# Patient Record
Sex: FEMALE | Race: WHITE | Employment: UNEMPLOYED | URBAN - METROPOLITAN AREA
[De-identification: names, ages, dates, MRNs, and addresses within clinical notes are randomized per-mention and may not be internally consistent; named-entity substitution may affect disease eponyms.]

---

## 2017-07-12 ENCOUNTER — TELEPHONE (OUTPATIENT)
Dept: FAMILY MEDICINE CLINIC | Age: 27
End: 2017-07-12

## 2017-07-13 ENCOUNTER — NURSE ONLY (OUTPATIENT)
Dept: FAMILY MEDICINE CLINIC | Age: 27
End: 2017-07-13

## 2017-07-13 DIAGNOSIS — Z23 NEED FOR TDAP VACCINATION: Primary | ICD-10-CM

## 2017-07-13 PROCEDURE — 90471 IMMUNIZATION ADMIN: CPT | Performed by: FAMILY MEDICINE

## 2017-07-13 PROCEDURE — 90715 TDAP VACCINE 7 YRS/> IM: CPT | Performed by: FAMILY MEDICINE

## 2017-10-03 ENCOUNTER — OFFICE VISIT (OUTPATIENT)
Dept: FAMILY MEDICINE CLINIC | Age: 27
End: 2017-10-03

## 2017-10-03 VITALS
DIASTOLIC BLOOD PRESSURE: 70 MMHG | HEART RATE: 80 BPM | OXYGEN SATURATION: 98 % | BODY MASS INDEX: 23.11 KG/M2 | WEIGHT: 143.2 LBS | SYSTOLIC BLOOD PRESSURE: 110 MMHG

## 2017-10-03 DIAGNOSIS — R20.8 SKIN PAIN: ICD-10-CM

## 2017-10-03 DIAGNOSIS — B07.0 PLANTAR WART OF RIGHT FOOT: Primary | ICD-10-CM

## 2017-10-03 PROCEDURE — 99999 PR OFFICE/OUTPT VISIT,PROCEDURE ONLY: CPT | Performed by: FAMILY MEDICINE

## 2017-10-03 PROCEDURE — 17110 DESTRUCTION B9 LES UP TO 14: CPT | Performed by: FAMILY MEDICINE

## 2017-10-03 NOTE — PATIENT INSTRUCTIONS
Please read the healthy family handout that you were given and share it with your family. Please compare this printed medication list with your medications at home to be sure they are the same. If you have any medications that are different please contact us immediately at [phone]. Also review your allergies that we have listed, these may also include medications that you have not been able to tolerate, make sure everything listed is correct. If you have any allergies that are different please contact us immediately at [phone].

## 2017-10-03 NOTE — MR AVS SNAPSHOT
After Visit Summary             Ashley Klein   10/3/2017 2:40 PM   Office Visit    Description:  Female : 1990   Provider:  Piyush Fernandes MD   Department:  [facility]              Your Follow-Up and Future Appointments         Below is a list of your follow-up and future appointments. This may not be a complete list as you may have made appointments directly with providers that we are not aware of or your providers may have made some for you. Please call your providers to confirm appointments. It is important to keep your appointments. Please bring your current insurance card, photo ID, co-pay, and all medication bottles to your appointment. If self-pay, payment is expected at the time of service. Information from Your Visit        Department     Name Address Phone Fax    [address]  [phone]      You Were Seen for:         Comments    Plantar wart of right foot   [[id number]]         Vital Signs     Blood Pressure Pulse Weight Oxygen Saturation Breastfeeding? Body Mass Index    110/70 80 143 lb 3.2 oz (65 kg) 98% Yes 23.11 kg/m2    Smoking Status                   Never Smoker           Instructions     Please read the healthy family handout that you were given and share it with your family. Please compare this printed medication list with your medications at home to be sure they are the same. If you have any medications that are different please contact us immediately at [phone]. Also review your allergies that we have listed, these may also include medications that you have not been able to tolerate, make sure everything listed is correct. If you have any allergies that are different please contact us immediately at [phone].               Today's Medication Changes          These changes are accurate as of: 10/3/17  3:10 PM.  If you have any questions, ask your nurse or doctor. STOP taking these medications           CALCIUM PO   Stopped by:  Kylee Samuels MD       [facility] by:  Kylee Samuels MD       NUVARING 0.12-0.015 MG/24HR vaginal ring   Generic drug:  etonogestrel-ethinyl estradiol   Stopped by:  Kylee Samuels MD       rizatriptan 10 MG tablet   Commonly known as:  [place] by:  Kylee Samuels MD       topiramate 25 MG tablet   Commonly known as:  TOPAMAX   Stopped by:  Kylee Samuels MD       ZOLMitriptan 5 MG tablet   Commonly known as:  [place] Pock by:  Kylee Samuels MD               Your Current Medications Are              amitriptyline (ELAVIL) 25 MG tablet Take 1 tablet by mouth nightly    UNABLE TO FIND [facility]      Allergies           No Known Allergies      We Ordered/Performed the following           76240 - ME DESTRUCTION BENIGN LESIONS UP TO 15          Additional Information        Basic Information     Date Of Birth Sex Race Ethnicity Preferred Language    1990 Female White Non-/Non  English      Problem List as of 10/3/2017  Date Reviewed: 8/23/2016                Migraine without aura and without status migrainosus, not intractable      Immunizations as of 10/3/2017     Name Date    HPV Gardasil Quadrivalent 1/1/2008, 1/1/2008, 1/1/2008    Td 1/1/2010    Tdap (Boostrix, Adacel) 7/13/2017      Preventive Care        Date Due    HIV screening is recommended for all people regardless of risk factors  aged 15-65 years at least once (lifetime) who have never been HIV tested. 11/29/2004    Pap Smear 11/29/2010    Yearly Flu Vaccine (1) 9/1/2017    Tetanus Combination Vaccine (2 - Td) 7/13/2027            MyChart Signup           Our records indicate that you have declined MyChart signup.

## 2017-10-03 NOTE — PROGRESS NOTES
Procedure: Cryotherapy of 1 lesion    Indication: lesions of skin, rt foot 4th toe tip. the lesion is causing discomfort    Anesthesia: None    Narrative: The procedure was explained. All questions were answered. Consent was obtained. Universal protocol was followed and sites were confirmed with patient. Liquid nitrogen was used to perform a 40 second freeze of each lesion. Patient tolerated procedure well. Post procedure care was explained to patient. Call if lesion does not totally resolve after healing. Call for any signs of infection or other problems related to the procedure.

## 2017-12-20 ENCOUNTER — TELEPHONE (OUTPATIENT)
Dept: FAMILY MEDICINE CLINIC | Age: 27
End: 2017-12-20

## 2017-12-20 NOTE — TELEPHONE ENCOUNTER
The patient had to leave work due to a migraine.  She will need a work note for today, is this ok to do? rc

## 2018-02-19 ENCOUNTER — OFFICE VISIT (OUTPATIENT)
Dept: FAMILY MEDICINE CLINIC | Age: 28
End: 2018-02-19

## 2018-02-19 VITALS
BODY MASS INDEX: 21.74 KG/M2 | HEIGHT: 66 IN | OXYGEN SATURATION: 97 % | SYSTOLIC BLOOD PRESSURE: 101 MMHG | WEIGHT: 135.25 LBS | TEMPERATURE: 98.5 F | HEART RATE: 93 BPM | DIASTOLIC BLOOD PRESSURE: 67 MMHG

## 2018-02-19 DIAGNOSIS — L65.9 HAIR LOSS: Primary | ICD-10-CM

## 2018-02-19 PROCEDURE — 99213 OFFICE O/P EST LOW 20 MIN: CPT | Performed by: FAMILY MEDICINE

## 2018-02-19 ASSESSMENT — PATIENT HEALTH QUESTIONNAIRE - PHQ9
SUM OF ALL RESPONSES TO PHQ QUESTIONS 1-9: 0
1. LITTLE INTEREST OR PLEASURE IN DOING THINGS: 0
2. FEELING DOWN, DEPRESSED OR HOPELESS: 0
SUM OF ALL RESPONSES TO PHQ9 QUESTIONS 1 & 2: 0

## 2018-02-19 NOTE — PATIENT INSTRUCTIONS
Please read the healthy family handout that you were given and share it with your family. Please compare this printed medication list with your medications at home to be sure they are the same. If you have any medications that are different please contact us immediately at [phone]. Also review your allergies that we have listed, these may also include medications that you have not been able to tolerate, make sure everything listed is correct. If you have any allergies that are different please contact us immediately at [phone].

## 2018-04-14 ENCOUNTER — TELEPHONE (OUTPATIENT)
Dept: FAMILY MEDICINE CLINIC | Age: 28
End: 2018-04-14

## 2018-04-14 RX ORDER — NYSTATIN 100000 U/G
OINTMENT TOPICAL
Qty: 1 TUBE | Refills: 0 | Status: SHIPPED | OUTPATIENT
Start: 2018-04-14 | End: 2019-10-07 | Stop reason: ALTCHOICE

## 2018-07-27 ENCOUNTER — OFFICE VISIT (OUTPATIENT)
Dept: FAMILY MEDICINE CLINIC | Age: 28
End: 2018-07-27

## 2018-07-27 VITALS
OXYGEN SATURATION: 98 % | HEART RATE: 77 BPM | SYSTOLIC BLOOD PRESSURE: 118 MMHG | DIASTOLIC BLOOD PRESSURE: 77 MMHG | BODY MASS INDEX: 22.21 KG/M2 | WEIGHT: 137.6 LBS

## 2018-07-27 DIAGNOSIS — B07.9 VIRAL WART ON FINGER: Primary | ICD-10-CM

## 2018-07-27 PROCEDURE — 99212 OFFICE O/P EST SF 10 MIN: CPT | Performed by: NURSE PRACTITIONER

## 2018-07-27 PROCEDURE — 17110 DESTRUCTION B9 LES UP TO 14: CPT | Performed by: NURSE PRACTITIONER

## 2018-07-27 ASSESSMENT — ENCOUNTER SYMPTOMS
NAUSEA: 0
ROS SKIN COMMENTS: + WART

## 2019-10-07 ENCOUNTER — OFFICE VISIT (OUTPATIENT)
Dept: FAMILY MEDICINE CLINIC | Age: 29
End: 2019-10-07
Payer: COMMERCIAL

## 2019-10-07 VITALS
WEIGHT: 153.4 LBS | DIASTOLIC BLOOD PRESSURE: 77 MMHG | BODY MASS INDEX: 24.65 KG/M2 | HEIGHT: 66 IN | OXYGEN SATURATION: 98 % | HEART RATE: 95 BPM | SYSTOLIC BLOOD PRESSURE: 114 MMHG

## 2019-10-07 DIAGNOSIS — H10.31 ACUTE BACTERIAL CONJUNCTIVITIS OF RIGHT EYE: Primary | ICD-10-CM

## 2019-10-07 PROCEDURE — 99213 OFFICE O/P EST LOW 20 MIN: CPT | Performed by: NURSE PRACTITIONER

## 2019-10-07 RX ORDER — ERYTHROMYCIN 5 MG/G
1 OINTMENT OPHTHALMIC EVERY 6 HOURS
Qty: 3.5 G | Refills: 0 | Status: SHIPPED | OUTPATIENT
Start: 2019-10-07 | End: 2019-10-17

## 2019-10-07 RX ORDER — MULTIVIT WITH MINERALS/LUTEIN
250 TABLET ORAL DAILY
COMMUNITY

## 2019-10-07 ASSESSMENT — ENCOUNTER SYMPTOMS
RESPIRATORY NEGATIVE: 1
GASTROINTESTINAL NEGATIVE: 1
EYE DISCHARGE: 1

## 2020-08-06 ENCOUNTER — OFFICE VISIT (OUTPATIENT)
Dept: PRIMARY CARE CLINIC | Age: 30
End: 2020-08-06
Payer: COMMERCIAL

## 2020-08-06 PROCEDURE — 99211 OFF/OP EST MAY X REQ PHY/QHP: CPT | Performed by: NURSE PRACTITIONER

## 2020-08-06 NOTE — PATIENT INSTRUCTIONS

## 2020-08-08 LAB — SARS-COV-2, NAA: NOT DETECTED

## 2021-06-04 ENCOUNTER — HOSPITAL ENCOUNTER (OUTPATIENT)
Age: 31
Discharge: HOME | End: 2021-06-04
Attending: OBSTETRICS & GYNECOLOGY | Admitting: OBSTETRICS & GYNECOLOGY
Payer: COMMERCIAL

## 2021-06-04 VITALS
WEIGHT: 156 LBS | TEMPERATURE: 98.7 F | RESPIRATION RATE: 18 BRPM | HEIGHT: 66 IN | BODY MASS INDEX: 25.07 KG/M2 | DIASTOLIC BLOOD PRESSURE: 74 MMHG | HEART RATE: 90 BPM | SYSTOLIC BLOOD PRESSURE: 116 MMHG

## 2021-06-04 LAB
BACTERIA: ABNORMAL /HPF
BILIRUBIN URINE: NEGATIVE
BLOOD, URINE: ABNORMAL
CLARITY: CLEAR
COLOR: ABNORMAL
EPITHELIAL CELLS, UA: ABNORMAL /HPF (ref 0–5)
GLUCOSE URINE: NEGATIVE MG/DL
KETONES, URINE: NEGATIVE MG/DL
LEUKOCYTE ESTERASE, URINE: ABNORMAL
MICROSCOPIC EXAMINATION: YES
NITRITE, URINE: NEGATIVE
PH UA: 7 (ref 5–8)
PROTEIN UA: NEGATIVE MG/DL
RBC UA: ABNORMAL /HPF (ref 0–4)
SPECIFIC GRAVITY UA: 1.01 (ref 1–1.03)
URINE TYPE: ABNORMAL
UROBILINOGEN, URINE: 0.2 E.U./DL
WBC UA: ABNORMAL /HPF (ref 0–5)

## 2021-06-04 PROCEDURE — 99211 OFF/OP EST MAY X REQ PHY/QHP: CPT

## 2021-06-04 PROCEDURE — 99234 HOSP IP/OBS SM DT SF/LOW 45: CPT | Performed by: OBSTETRICS & GYNECOLOGY

## 2021-06-04 PROCEDURE — 81001 URINALYSIS AUTO W/SCOPE: CPT

## 2021-06-05 NOTE — PROGRESS NOTES
Pt presents to triage with c/o \"pulling/tightening\" pain all over abdomen x 2 days. 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 Denies any leakage of fluid or vaginal bleeding. Pt receives prenatal care with Radha Duarte, midwife with [facility]. Pt plans a home birth. Pt states she spoke with her midwife about her c/o. States she has had nausea today, no emesis.

## 2021-06-05 NOTE — PROGRESS NOTES
Pt given verbal and written discharge instructions. Pt encouraged to keep her next scheduled appointment with her midwife and to call her with any questions, concerns or worsening of symptoms. Pt discharged to home per private vehicle in stable condition, pt not in active labor.

## 2021-06-05 NOTE — H&P
Department of Obstetrics and Gynecology  Labor and Delivery  Attending Triage Note      SUBJECTIVE:  Pt. c/o abdominal pain. Says a few days ago she worked a 12 hour shift and since then she's had some pain in the middle of her belly. Feels like it just goes down the middle, is like a tightening that gets better with sitting and position changes but today it felt like more of a constant tightness so came in for evaluation. No fevers, chills, vomiting. Denies diarrhea or constipation. No dysuria/hematuria. +FM. No VB/LOF/abnormal discharge. Has not taken anything for the pain since it started. ROS otherwise negative. OBJECTIVE    Vitals:  Vitals:    06/04/21 2033   BP: 116/74   Pulse: 90   Resp: 18   Temp: 98.7 °F (37.1 °C)       Physical Exam  Constitutional:       Appearance: Normal appearance. HENT:      Head: Normocephalic. Cardiovascular:      Rate and Rhythm: Normal rate and regular rhythm. Pulmonary:      Effort: Pulmonary effort is normal. No respiratory distress. Abdominal:      General: There is no distension. Palpations: Abdomen is soft. Tenderness: There is no abdominal tenderness. There is no guarding or rebound. Skin:     General: Skin is warm and dry. Neurological:      General: No focal deficit present. Mental Status: She is alert.    Psychiatric:         Mood and Affect: Mood normal.         Cervix:  closed  Membranes:  Intact  Fetal heart rate:  154    Labs:  Admission on 06/04/2021, Discharged on 06/04/2021   Component Date Value Ref Range Status    Color, UA 06/04/2021 Straw  Straw/Yellow Final    Clarity, UA 06/04/2021 Clear  Clear Final    Glucose, Ur 06/04/2021 Negative  Negative mg/dL Final    Bilirubin Urine 06/04/2021 Negative  Negative Final    Ketones, Urine 06/04/2021 Negative  Negative mg/dL Final    Specific Gravity, UA 06/04/2021 1.010  1.005 - 1.030 Final    Blood, Urine 06/04/2021 SMALL* Negative Final    pH, UA 06/04/2021 7.0  5.0 - 8.0 Final  Protein, UA 2021 Negative  Negative mg/dL Final    Urobilinogen, Urine 2021 0.2  <2.0 E.U./dL Final    Nitrite, Urine 2021 Negative  Negative Final    Leukocyte Esterase, Urine 2021 SMALL* Negative Final    Microscopic Examination 2021 YES   Final    Urine Type 2021 NotGiven   Final    Urine received in a container without preservatives.  WBC, UA 2021 6-9* 0 - 5 /HPF Final    RBC, UA 2021 0-2  0 - 4 /HPF Final    Epithelial Cells, UA 2021 6-10* 0 - 5 /HPF Final    Bacteria, UA 2021 2+* None Seen /HPF Final         ASSESSMENT & PLAN:    Lincoln Georges is a 31 y.o.  at 20+6 here with abdominal pain    1. Fetal Status: reassuring    2. Abdominal tightening  - exam benign, UA as above  - suspect MSK pain, discussed symptomatic control with tylenol, heating/ice packs and abdominal binder  - return/labor precautions reviewed, all questions answered    Attending provider Dr. Portia Mathias updated regarding patient status.     Sherine Jackson MD

## 2022-10-25 ENCOUNTER — HOSPITAL ENCOUNTER (OUTPATIENT)
Age: 32
Discharge: HOME | End: 2022-10-25
Attending: OBSTETRICS & GYNECOLOGY | Admitting: OBSTETRICS & GYNECOLOGY
Payer: COMMERCIAL

## 2022-10-25 VITALS
TEMPERATURE: 98.1 F | HEART RATE: 100 BPM | BODY MASS INDEX: 24.11 KG/M2 | DIASTOLIC BLOOD PRESSURE: 74 MMHG | HEIGHT: 66 IN | OXYGEN SATURATION: 98 % | RESPIRATION RATE: 18 BRPM | SYSTOLIC BLOOD PRESSURE: 124 MMHG | WEIGHT: 150 LBS

## 2022-10-25 LAB
BILIRUBIN URINE: NEGATIVE
BLOOD, URINE: ABNORMAL
CLARITY: ABNORMAL
COLOR: YELLOW
COMMENT UA: ABNORMAL
EPITHELIAL CELLS, UA: >100 /HPF (ref 0–5)
GLUCOSE URINE: NEGATIVE MG/DL
KETONES, URINE: NEGATIVE MG/DL
LEUKOCYTE ESTERASE, URINE: ABNORMAL
MICROSCOPIC EXAMINATION: YES
NITRITE, URINE: NEGATIVE
PH UA: 6.5 (ref 5–8)
PROTEIN UA: NEGATIVE MG/DL
RBC UA: ABNORMAL /HPF (ref 0–4)
SPECIFIC GRAVITY UA: 1.01 (ref 1–1.03)
URINE TYPE: ABNORMAL
UROBILINOGEN, URINE: 1 E.U./DL
WBC UA: ABNORMAL /HPF (ref 0–5)

## 2022-10-25 PROCEDURE — 81001 URINALYSIS AUTO W/SCOPE: CPT

## 2022-10-25 PROCEDURE — 99211 OFF/OP EST MAY X REQ PHY/QHP: CPT

## 2022-10-25 RX ORDER — NITROFURANTOIN 25; 75 MG/1; MG/1
100 CAPSULE ORAL 2 TIMES DAILY
Qty: 28 CAPSULE | Refills: 0 | Status: SHIPPED | OUTPATIENT
Start: 2022-10-25 | End: 2022-11-08

## 2022-10-26 NOTE — FLOWSHEET NOTE
Patient discharged home at this time. Patient left unit undelivered, ambulatory in stable condition. Discharge instructions reviewed with patient, denies questions and verbalizes understanding.

## 2022-10-26 NOTE — FLOWSHEET NOTE
PT presented to [facility] triage unit with complaints of N/V/D this morning, however able to keep down chili and pie this evening. Also complains of UTI sx, with just completing treatment for a UTI today. PT ambulated to T1. Oriented to room and telephone. Pt provided urine sample and fetal doppler done with audible movement and HR of 155-160. Pt states she is feeling the baby move. Denies any leaking of fluid, contractions or vaginal bleeding. Denies any additional pain/concerns at this time.

## 2024-12-23 RX ORDER — OXYCODONE AND ACETAMINOPHEN 5; 325 MG/1; MG/1
1 TABLET ORAL EVERY 4 HOURS PRN
COMMUNITY

## 2024-12-23 RX ORDER — ONDANSETRON 4 MG/1
4 TABLET, FILM COATED ORAL EVERY 8 HOURS PRN
COMMUNITY

## 2024-12-23 RX ORDER — TAMSULOSIN HYDROCHLORIDE 0.4 MG/1
0.4 CAPSULE ORAL DAILY
COMMUNITY

## 2024-12-23 NOTE — PROGRESS NOTES

## 2024-12-31 ENCOUNTER — ANESTHESIA (OUTPATIENT)
Dept: OPERATING ROOM | Age: 34
End: 2024-12-31
Payer: COMMERCIAL

## 2024-12-31 ENCOUNTER — HOSPITAL ENCOUNTER (OUTPATIENT)
Age: 34
Setting detail: OUTPATIENT SURGERY
Discharge: HOME | End: 2024-12-31
Attending: UROLOGY | Admitting: UROLOGY
Payer: COMMERCIAL

## 2024-12-31 ENCOUNTER — APPOINTMENT (OUTPATIENT)
Dept: GENERAL RADIOLOGY | Age: 34
End: 2024-12-31
Attending: UROLOGY
Payer: COMMERCIAL

## 2024-12-31 ENCOUNTER — ANESTHESIA EVENT (OUTPATIENT)
Dept: OPERATING ROOM | Age: 34
End: 2024-12-31
Payer: COMMERCIAL

## 2024-12-31 VITALS
WEIGHT: 160 LBS | DIASTOLIC BLOOD PRESSURE: 72 MMHG | TEMPERATURE: 97.1 F | SYSTOLIC BLOOD PRESSURE: 111 MMHG | RESPIRATION RATE: 15 BRPM | HEART RATE: 56 BPM | OXYGEN SATURATION: 98 % | BODY MASS INDEX: 26.66 KG/M2 | HEIGHT: 65 IN

## 2024-12-31 DIAGNOSIS — N20.1 CALCULUS OF URETER: ICD-10-CM

## 2024-12-31 LAB — HCG UR QL: NEGATIVE

## 2024-12-31 PROCEDURE — 2720000010 HC SURG SUPPLY STERILE: Performed by: UROLOGY

## 2024-12-31 PROCEDURE — 2580000003 HC RX 258: Performed by: UROLOGY

## 2024-12-31 PROCEDURE — C1769 GUIDE WIRE: HCPCS | Performed by: UROLOGY

## 2024-12-31 PROCEDURE — 6360000002 HC RX W HCPCS: Performed by: NURSE ANESTHETIST, CERTIFIED REGISTERED

## 2024-12-31 PROCEDURE — 6370000000 HC RX 637 (ALT 250 FOR IP): Performed by: UROLOGY

## 2024-12-31 PROCEDURE — 6360000002 HC RX W HCPCS: Performed by: ANESTHESIOLOGY

## 2024-12-31 PROCEDURE — 76000 FLUOROSCOPY <1 HR PHYS/QHP: CPT

## 2024-12-31 PROCEDURE — 3700000001 HC ADD 15 MINUTES (ANESTHESIA): Performed by: UROLOGY

## 2024-12-31 PROCEDURE — 3600000004 HC SURGERY LEVEL 4 BASE: Performed by: UROLOGY

## 2024-12-31 PROCEDURE — 82365 CALCULUS SPECTROSCOPY: CPT

## 2024-12-31 PROCEDURE — 3700000000 HC ANESTHESIA ATTENDED CARE: Performed by: UROLOGY

## 2024-12-31 PROCEDURE — 88300 SURGICAL PATH GROSS: CPT

## 2024-12-31 PROCEDURE — 7100000000 HC PACU RECOVERY - FIRST 15 MIN: Performed by: UROLOGY

## 2024-12-31 PROCEDURE — 7100000010 HC PHASE II RECOVERY - FIRST 15 MIN: Performed by: UROLOGY

## 2024-12-31 PROCEDURE — 2580000003 HC RX 258: Performed by: ANESTHESIOLOGY

## 2024-12-31 PROCEDURE — 84703 CHORIONIC GONADOTROPIN ASSAY: CPT

## 2024-12-31 PROCEDURE — 7100000011 HC PHASE II RECOVERY - ADDTL 15 MIN: Performed by: UROLOGY

## 2024-12-31 PROCEDURE — 3600000014 HC SURGERY LEVEL 4 ADDTL 15MIN: Performed by: UROLOGY

## 2024-12-31 PROCEDURE — 7100000001 HC PACU RECOVERY - ADDTL 15 MIN: Performed by: UROLOGY

## 2024-12-31 PROCEDURE — 2709999900 HC NON-CHARGEABLE SUPPLY: Performed by: UROLOGY

## 2024-12-31 PROCEDURE — 6360000002 HC RX W HCPCS: Performed by: UROLOGY

## 2024-12-31 PROCEDURE — 2500000003 HC RX 250 WO HCPCS: Performed by: UROLOGY

## 2024-12-31 RX ORDER — DEXAMETHASONE SODIUM PHOSPHATE 4 MG/ML
INJECTION, SOLUTION INTRA-ARTICULAR; INTRALESIONAL; INTRAMUSCULAR; INTRAVENOUS; SOFT TISSUE
Status: DISCONTINUED | OUTPATIENT
Start: 2024-12-31 | End: 2024-12-31 | Stop reason: SDUPTHER

## 2024-12-31 RX ORDER — LABETALOL HYDROCHLORIDE 5 MG/ML
10 INJECTION, SOLUTION INTRAVENOUS
Status: DISCONTINUED | OUTPATIENT
Start: 2024-12-31 | End: 2024-12-31 | Stop reason: HOSPADM

## 2024-12-31 RX ORDER — DIPHENHYDRAMINE HYDROCHLORIDE 50 MG/ML
12.5 INJECTION INTRAMUSCULAR; INTRAVENOUS
Status: DISCONTINUED | OUTPATIENT
Start: 2024-12-31 | End: 2024-12-31 | Stop reason: HOSPADM

## 2024-12-31 RX ORDER — ONDANSETRON 2 MG/ML
INJECTION INTRAMUSCULAR; INTRAVENOUS
Status: DISCONTINUED | OUTPATIENT
Start: 2024-12-31 | End: 2024-12-31 | Stop reason: SDUPTHER

## 2024-12-31 RX ORDER — FENTANYL CITRATE 50 UG/ML
INJECTION, SOLUTION INTRAMUSCULAR; INTRAVENOUS
Status: DISCONTINUED | OUTPATIENT
Start: 2024-12-31 | End: 2024-12-31 | Stop reason: SDUPTHER

## 2024-12-31 RX ORDER — PROPOFOL 10 MG/ML
INJECTION, EMULSION INTRAVENOUS
Status: DISCONTINUED | OUTPATIENT
Start: 2024-12-31 | End: 2024-12-31 | Stop reason: SDUPTHER

## 2024-12-31 RX ORDER — SODIUM CHLORIDE, SODIUM LACTATE, POTASSIUM CHLORIDE, CALCIUM CHLORIDE 600; 310; 30; 20 MG/100ML; MG/100ML; MG/100ML; MG/100ML
INJECTION, SOLUTION INTRAVENOUS CONTINUOUS
Status: DISCONTINUED | OUTPATIENT
Start: 2024-12-31 | End: 2024-12-31 | Stop reason: HOSPADM

## 2024-12-31 RX ORDER — NALOXONE HYDROCHLORIDE 0.4 MG/ML
INJECTION, SOLUTION INTRAMUSCULAR; INTRAVENOUS; SUBCUTANEOUS PRN
Status: DISCONTINUED | OUTPATIENT
Start: 2024-12-31 | End: 2024-12-31 | Stop reason: HOSPADM

## 2024-12-31 RX ORDER — SODIUM CHLORIDE 9 MG/ML
INJECTION, SOLUTION INTRAVENOUS PRN
Status: DISCONTINUED | OUTPATIENT
Start: 2024-12-31 | End: 2024-12-31 | Stop reason: HOSPADM

## 2024-12-31 RX ORDER — KETOROLAC TROMETHAMINE 30 MG/ML
INJECTION, SOLUTION INTRAMUSCULAR; INTRAVENOUS
Status: DISCONTINUED | OUTPATIENT
Start: 2024-12-31 | End: 2024-12-31 | Stop reason: SDUPTHER

## 2024-12-31 RX ORDER — SODIUM CHLORIDE 0.9 % (FLUSH) 0.9 %
5-40 SYRINGE (ML) INJECTION EVERY 12 HOURS SCHEDULED
Status: DISCONTINUED | OUTPATIENT
Start: 2024-12-31 | End: 2024-12-31 | Stop reason: HOSPADM

## 2024-12-31 RX ORDER — SODIUM CHLORIDE 0.9 % (FLUSH) 0.9 %
5-40 SYRINGE (ML) INJECTION PRN
Status: DISCONTINUED | OUTPATIENT
Start: 2024-12-31 | End: 2024-12-31 | Stop reason: HOSPADM

## 2024-12-31 RX ORDER — LIDOCAINE HYDROCHLORIDE 10 MG/ML
0.3 INJECTION, SOLUTION EPIDURAL; INFILTRATION; INTRACAUDAL; PERINEURAL
Status: DISCONTINUED | OUTPATIENT
Start: 2024-12-31 | End: 2024-12-31 | Stop reason: HOSPADM

## 2024-12-31 RX ORDER — LIDOCAINE HYDROCHLORIDE 20 MG/ML
JELLY TOPICAL PRN
Status: DISCONTINUED | OUTPATIENT
Start: 2024-12-31 | End: 2024-12-31 | Stop reason: ALTCHOICE

## 2024-12-31 RX ORDER — OXYCODONE HYDROCHLORIDE 5 MG/1
5 TABLET ORAL PRN
Status: DISCONTINUED | OUTPATIENT
Start: 2024-12-31 | End: 2024-12-31 | Stop reason: HOSPADM

## 2024-12-31 RX ORDER — ONDANSETRON 2 MG/ML
4 INJECTION INTRAMUSCULAR; INTRAVENOUS
Status: COMPLETED | OUTPATIENT
Start: 2024-12-31 | End: 2024-12-31

## 2024-12-31 RX ORDER — OXYCODONE HYDROCHLORIDE 5 MG/1
10 TABLET ORAL PRN
Status: DISCONTINUED | OUTPATIENT
Start: 2024-12-31 | End: 2024-12-31 | Stop reason: HOSPADM

## 2024-12-31 RX ORDER — MEPERIDINE HYDROCHLORIDE 25 MG/ML
12.5 INJECTION INTRAMUSCULAR; INTRAVENOUS; SUBCUTANEOUS EVERY 5 MIN PRN
Status: DISCONTINUED | OUTPATIENT
Start: 2024-12-31 | End: 2024-12-31 | Stop reason: HOSPADM

## 2024-12-31 RX ORDER — LIDOCAINE HYDROCHLORIDE 20 MG/ML
INJECTION, SOLUTION EPIDURAL; INFILTRATION; INTRACAUDAL; PERINEURAL
Status: DISCONTINUED | OUTPATIENT
Start: 2024-12-31 | End: 2024-12-31 | Stop reason: SDUPTHER

## 2024-12-31 RX ADMIN — SODIUM CHLORIDE 2000 MG: 900 INJECTION INTRAVENOUS at 13:57

## 2024-12-31 RX ADMIN — ONDANSETRON 4 MG: 2 INJECTION INTRAMUSCULAR; INTRAVENOUS at 14:46

## 2024-12-31 RX ADMIN — FENTANYL CITRATE 100 MCG: 50 INJECTION INTRAMUSCULAR; INTRAVENOUS at 14:00

## 2024-12-31 RX ADMIN — DEXAMETHASONE SODIUM PHOSPHATE 4 MG: 4 INJECTION INTRA-ARTICULAR; INTRALESIONAL; INTRAMUSCULAR; INTRAVENOUS; SOFT TISSUE at 14:05

## 2024-12-31 RX ADMIN — LIDOCAINE HYDROCHLORIDE 60 MG: 20 INJECTION, SOLUTION EPIDURAL; INFILTRATION; INTRACAUDAL; PERINEURAL at 13:56

## 2024-12-31 RX ADMIN — ONDANSETRON 4 MG: 2 INJECTION INTRAMUSCULAR; INTRAVENOUS at 14:05

## 2024-12-31 RX ADMIN — SODIUM CHLORIDE, POTASSIUM CHLORIDE, SODIUM LACTATE AND CALCIUM CHLORIDE: 600; 310; 30; 20 INJECTION, SOLUTION INTRAVENOUS at 12:45

## 2024-12-31 RX ADMIN — HYDROMORPHONE HYDROCHLORIDE 0.5 MG: 1 INJECTION, SOLUTION INTRAMUSCULAR; INTRAVENOUS; SUBCUTANEOUS at 14:43

## 2024-12-31 RX ADMIN — KETOROLAC TROMETHAMINE 30 MG: 30 INJECTION, SOLUTION INTRAMUSCULAR at 14:16

## 2024-12-31 RX ADMIN — PROPOFOL 200 MG: 10 INJECTION, EMULSION INTRAVENOUS at 13:56

## 2024-12-31 ASSESSMENT — PAIN DESCRIPTION - PAIN TYPE: TYPE: SURGICAL PAIN

## 2024-12-31 ASSESSMENT — PAIN DESCRIPTION - DESCRIPTORS: DESCRIPTORS: SORE

## 2024-12-31 ASSESSMENT — PAIN SCALES - GENERAL
PAINLEVEL_OUTOF10: 0
PAINLEVEL_OUTOF10: 9
PAINLEVEL_OUTOF10: 4

## 2024-12-31 ASSESSMENT — PAIN - FUNCTIONAL ASSESSMENT
PAIN_FUNCTIONAL_ASSESSMENT: 0-10
PAIN_FUNCTIONAL_ASSESSMENT: 0-10
PAIN_FUNCTIONAL_ASSESSMENT: ACTIVITIES ARE NOT PREVENTED

## 2024-12-31 ASSESSMENT — PAIN DESCRIPTION - ORIENTATION: ORIENTATION: LEFT

## 2024-12-31 ASSESSMENT — PAIN DESCRIPTION - LOCATION: LOCATION: FLANK

## 2024-12-31 NOTE — ANESTHESIA PRE PROCEDURE
Department of Anesthesiology  Preprocedure Note       Name:  Joseph Klein   Age:  34 y.o.  :  1990                                          MRN:  [id number]         Date:  2024      Surgeon: Surgeon(s):  Calderon Noyola MD    Procedure: Procedure(s):  CYSTOSCOPY LEFT URETEROSCOPY STONE MANIPULATION HOLMIUM LASER LITHOTRIPSY, POSSIBLE LEFT STENT PLACEMENT    Medications prior to admission:   Prior to Admission medications    Medication Sig Start Date End Date Taking? Authorizing Provider   Inositol 324 MG TABS Take 1 tablet by mouth daily   Yes Mandi Boudreaux MD   tamsulosin (FLOMAX) 0.4 MG capsule Take 1 capsule by mouth daily   Yes Mandi Boudreaux MD   ondansetron (ZOFRAN) 4 MG tablet Take 1 tablet by mouth every 8 hours as needed for Nausea or Vomiting   Yes Mandi Boudreaux MD   oxyCODONE-acetaminophen (PERCOCET) 5-325 MG per tablet Take 1 tablet by mouth every 4 hours as needed for Pain.   Yes Mandi Boudreaux MD   Probiotic Product (PROBIOTIC ACIDOPHILUS BEADS PO) Take by mouth daily  Patient not taking: Reported on 2024    Mandi Boudreaux MD   Prenatal Vit-Fe Fumarate-FA (PRENATAL 1+1 PO) Take by mouth  Patient not taking: Reported on 2024    Mandi Boudreaux MD   Ascorbic Acid (VITAMIN C) 250 MG tablet Take 250 mg by mouth daily  Patient not taking: Reported on 2024    Mandi Boudreaux MD       Current medications:    Current Facility-Administered Medications   Medication Dose Route Frequency Provider Last Rate Last Admin   • ceFAZolin (ANCEF) 2,000 mg in sodium chloride 0.9 % 50 mL IVPB (mini-bag)  2,000 mg IntraVENous Once Calderon Noyola MD       • lidocaine PF 1 % injection 0.3 mL  0.3 mL IntraDERmal Once PRN Julio Boyle MD       • lactated ringers infusion   IntraVENous Continuous Julio Boyle  mL/hr at 24 1245 New Bag at 24 1245   • sodium chloride flush 0.9 % injection 5-40 mL  5-40 mL

## 2024-12-31 NOTE — BRIEF OP NOTE
Brief Postoperative Note      Patient: Joseph Klein  YOB: 1990  MRN: [id number]    Date of Procedure: 12/31/2024    Pre-Op Diagnosis Codes:      * Calculus of ureter [N20.1]    Post-Op Diagnosis: Same       Procedure(s):  CYSTOSCOPY LEFT URETEROSCOPY STONE MANIPULATION  URS, Stone extraction    See detailed dictated operative notes for more procedural details    Surgeon(s):  Calderon Noyola MD    Assistant:  Surgical Assistant: Francia Rdz    Anesthesia: General    Estimated Blood Loss (mL): Minimal    Complications: None    Specimens:   ID Type Source Tests Collected by Time Destination   A : left ureteral stone Tissue Tissue SURGICAL PATHOLOGY Calderon Noyola MD 12/31/2024 1410        Implants:  * No implants in log *      Drains: * No LDAs found *    Findings:  Infection Present At Time Of Surgery (PATOS) (choose all levels that have infection present):  No infection present  Other Findings:      See detailed dictated operative notes for more procedural details        Electronically signed by Calderon Noyola MD on 12/31/2024 at 2:36 PM

## 2024-12-31 NOTE — CONSULTS
Patient:  Joseph Klein  YOB: 1990   CSN:  [id number]    Referring Provider:  No ref. provider found   Primary Care Provider:  Ob/Gyn, *[facility] (Inactive)       Urology Attending Consult Note     Reason for Consultation:  nephrolithiasis, flank pain    Chief Complaint: \"I have a stone\"  HPI:  Joseph is a 34 y.o. female who presented with history of severe renal colic which prompted CT and it showed a 5-6mm left distal ureter stone.  No fevers. The patient has been unable to pass the stone.        Past Medical History:   Diagnosis Date    Anxiety     Complication of anesthesia     blurry vision for a few days after general anesthesia for ectopic pregnancy    Hip pain     History of HPV infection     Migraine        Past Surgical History:   Procedure Laterality Date    ECTOPIC PREGNANCY SURGERY  09/2020    TUBAL LIGATION         Medication List reviewed:     Current Facility-Administered Medications   Medication Dose Route Frequency Provider Last Rate Last Admin    ceFAZolin (ANCEF) 2,000 mg in sodium chloride 0.9 % 50 mL IVPB (mini-bag)  2,000 mg IntraVENous Once Calderon Noyola MD        lidocaine PF 1 % injection 0.3 mL  0.3 mL IntraDERmal Once PRN Julio Boyle MD        lactated ringers infusion   IntraVENous Continuous Julio Boyle  mL/hr at 12/31/24 1245 New Bag at 12/31/24 1245    sodium chloride flush 0.9 % injection 5-40 mL  5-40 mL IntraVENous 2 times per day Julio Boyle MD        sodium chloride flush 0.9 % injection 5-40 mL  5-40 mL IntraVENous PRN Julio Boyle MD        0.9 % sodium chloride infusion   IntraVENous PRN Julio Boyle MD           Allergies   Allergen Reactions    Nickel        Family History   Problem Relation Age of Onset    High Blood Pressure Mother     High Blood Pressure Father     Cancer Father     Diabetes Maternal Grandmother        Social History     Tobacco Use    Smoking status: Never    Smokeless tobacco: Never   Vaping Use     Vaping status: Never Used   Substance Use Topics    Alcohol use: No    Drug use: No         Review of Systems: A 12 point ROS was performed and was unremarkable unless listed in the history of present illness.      No intake/output data recorded.    Physical Exam:  Patient Vitals for the past 8 hrs:   BP Temp Temp src Pulse Resp SpO2 Height Weight   12/31/24 1228 132/82 97.2 °F (36.2 °C) Temporal 74 16 99 % 1.651 m (5' 5\") 72.6 kg (160 lb)     Constitutional: pleasant patient in no acute distress  EENT: Grossly normal, stable for patient.   Neck: no masses or lesions, trachea midline   Respiratory: normal respiratory movements without distress   Cardiovascular: regular rate, lower extremities without edema   Abdomen: Stable, nondistended, no obvious masses  Back: Grossly normal, stable back   Skin: Exposed, warm and dry, no rash or ulcers  Musculoskeletal: nl  ROM, m. tone, no cyanosis, head normocephalic  Psych: normal mood and affect, alert and appropriately answers questions  : Stable bladder, No urethral catheter     Labs:     No results found for: \"PSA\"  No results found for: \"PSA\"  No results for input(s): \"WBC\", \"HGB\", \"HCT\", \"MCV\", \"PLT\" in the last 720 hours.  No results found for: \"LABA1C\"  Lab Results   Component Value Date    CREATININE 0.9 07/22/2016     Lab Results   Component Value Date     07/22/2016    K 4.2 07/22/2016     07/22/2016    CO2 23 07/22/2016    BUN 14 07/22/2016    CREATININE 0.9 07/22/2016    GLUCOSE 113 (H) 07/22/2016    CALCIUM 9.6 07/22/2016    BILITOT 0.4 07/22/2016    ALKPHOS 51 07/22/2016    AST 15 07/22/2016    ALT 11 07/22/2016    LABGLOM >60 07/22/2016    GFRAA >60 07/22/2016    AGRATIO 1.7 07/22/2016    GLOB 2.4 07/22/2016     Lab Results   Component Value Date    CREATININE 0.9 07/22/2016       Lab Results   Component Value Date/Time    COLORU Yellow 10/25/2022 10:50 PM    NITRU Negative 10/25/2022 10:50 PM    GLUCOSEU Negative 10/25/2022 10:50 PM    KETUA

## 2024-12-31 NOTE — DISCHARGE INSTRUCTIONS
Great news the stone was extracted.    We did not have to place a ureteral stent.      You should follow-up with the original provider with a renal bladder ultrasound in 4 to 6 weeks to check for any residual stones in the kidney.    Drink plenty of liquids and inspect some bloody urine and crampy pain for a few days    ANESTHESIA DISCHARGE INSTRUCTIONS    You are under the influence of drugs- do not drink alcohol, drive a car, operate machinery(such as power tools, kitchen appliances, etc), sign legal documents, or make any important decisions for 24 hours (or while on pain medications).   Children should not ride bikes or skate boards or play on gym sets  for 24 hours after surgery.  A responsible adult should be with you for 24 hours.  Rest at home today- increase activity as tolerated.  Progress slowly to a regular diet unless your physician has instructed you otherwise. Drink plenty of water.    CALL YOUR DOCTOR IF YOU:  Have moderate to severe nausea or vomiting AND are unable to hold down fluids or prescribed medications.  Have bright red bloody drainage from your dressing that won't stop oozing.  Do not get relief with your pain medication    NORMAL (POSSIBLE) SIDE EFFECTS FROM ANESTHESIA:     Confusion, temporary memory loss, delayed reaction times in the first 24 hours  Lightheadedness, dizziness, difficulty focusing, blurred vision  Nausea/vomiting can happen  Shivering, feeling cold, sore throat, cough and muscle aches should stop within 24-48 hours  Trouble urinating - call your surgeon if it has been more than 8 hrs  Bruising or soreness at the IV site - call if it remains red, firm or there is drainage             FEMALES OF CHILDBEARING AGE WHO ARE TAKING BIRTH CONTROL PILLS:  You may have received a medication during your procedure that interferes with the   actions of birth control pills (Bridion or Emend). Use some other kind of birth control in addition to your pills, like a condom, for 1 month

## 2024-12-31 NOTE — ANESTHESIA POSTPROCEDURE EVALUATION
Department of Anesthesiology  Postprocedure Note    Patient: Joseph Klein  MRN: [id number]  YOB: 1990  Date of evaluation: 12/31/2024    Procedure Summary       Date: 12/31/24 Room / Location: [address]    Anesthesia Start: 1350 Anesthesia Stop: 1421    Procedure: CYSTOSCOPY LEFT URETEROSCOPY STONE MANIPULATION (Left: Bladder) Diagnosis:       Calculus of ureter      (Calculus of ureter [N20.1])    Surgeons: Calderon Noyola MD Responsible Provider: Garth Rebollar MD    Anesthesia Type: general ASA Status: 1            Anesthesia Type: No value filed.    Michael Phase I: Michael Score: 10    Michael Phase II: Michael Score: 10    Anesthesia Post Evaluation    Patient location during evaluation: PACU  Patient participation: complete - patient participated  Level of consciousness: awake and alert  Airway patency: patent  Nausea & Vomiting: no nausea and no vomiting  Cardiovascular status: blood pressure returned to baseline  Respiratory status: acceptable  Hydration status: euvolemic  Comments: VSS on transfer to phase 2 recovery.  No anesthetic complications.  Pain management: adequate    No notable events documented.

## 2024-12-31 NOTE — H&P
H&P reviewed. The patient was examined and there are no changes to the H&P.     Hp from hospital notes, office notes, or printed at bedside.  See all documents including the scanned Documents.   These were reviewed with the patient and I examined the patient.  There was no change.  The surgical site was confirmed by the patient and me.     Vitals:    12/31/24 1228   BP: 132/82   Pulse: 74   Resp: 16   Temp: 97.2 °F (36.2 °C)   SpO2: 99%           Plan: The risks, benefits, expected outcome, and alternative to the recommended procedure have been discussed with the patient. Patient understands and wants to proceed with the procedure.     All questions answered.     Side marked

## 2024-12-31 NOTE — PROGRESS NOTES
CYSTOSCOPY LEFT URETEROSCOPY STONE MANIPULATION HOLMIUM LASER LITHOTRIPSY, POSSIBLE LEFT STENT PLACEMENT - Left with Calderon Noyola MD.

## 2025-01-01 NOTE — OP NOTE
[address] [address]                            OPERATIVE REPORT      PATIENT NAME: CRISTIAN MARQUEZ      : 1990  MED REC NO: [id number]                      ROOM: [facility]  ACCOUNT NO: [id number]                       ADMIT DATE: 2024  PROVIDER: Calderon Noyola MD      DATE OF PROCEDURE:  2024    SURGEON:  Calderon Noyola MD    PREOPERATIVE DIAGNOSES:  Left flank pain, left ureteral calculi.    POSTOPERATIVE DIAGNOSES:  Left flank pain, left ureteral calculi.    OPERATION:  Cystoscopy, left ureteroscopy with stone manipulation, basket extraction of stone.    ANESTHESIA:  General.    INDICATIONS:  The patient is a 34-year-old female who has had intermittent flank pain.  She had a CT showing a 6 mm left ureteral stone.  She has been unable to pass it.  On the CAT scan, she had very small bilateral renal stones.  I explained that we would just be removing the ureteral stone and she did sign the consent prior to surgery.    DESCRIPTION OF PROCEDURE:  The patient was brought to the operating theater, anesthesia induced, antibiotics administered.  Genitalia prepped and draped in the usual sterile fashion.  Scope advanced transurethrally.  Bladder grossly normal.  No tumors or foreign body seen in the bladder.  The mucosa was systematically surveyed and normal.  Both ureteral orifices were orthotopic.  The left orifice was seen and a guidewire was advanced through that.  It did pass without resistance.  We then assembled the rigid ureteroscope, I deployed it right next to the wire, and immediately upon entry, I saw the stone, it did look small enough that we could try to extract it without having to laser if we can manipulate the stone with the basket, so I advanced the basket and manipulated the stone in a longitudinal direction to make it easier to extract and I was able to pluck the stone in one piece, I

## 2025-01-02 LAB
APPEARANCE STONE: NORMAL
COMPN STONE: NORMAL
SPECIMEN WT: 142 MG

## 2025-07-07 NOTE — PROGRESS NOTES
Phani Klein received a viral test for COVID-19. They were educated on isolation and quarantine as appropriate. For any symptoms, they were directed to seek care from their PCP, given contact information to establish with a doctor, directed to an urgent care or the emergency room. suicidality/depression

## (undated) DEVICE — SOLUTION IRRIGATION STRL H2O 1000 ML UROMATIC CONTAINER

## (undated) DEVICE — MHCZ CYSTO: Brand: MEDLINE INDUSTRIES, INC.

## (undated) DEVICE — SYRINGE MED 10ML LUERLOCK TIP W/O SFTY DISP

## (undated) DEVICE — NITINOL STONE RETRIEVAL BASKET: Brand: ZERO TIP

## (undated) DEVICE — CIRCUIT ANES L72IN 3L BACT AND VIR FLTR EL CONN SGL LIMB

## (undated) DEVICE — GLOVE ORANGE PI 7   MSG9070

## (undated) DEVICE — GUIDEWIRE ENDOSCP L150CM DIA0.035IN TIP 3CM PTFE NIT

## (undated) DEVICE — Z INACTIVE USE 2635508 SOLUTION IRRIG 500ML 0.9% SOD CHL USP POUR PLAS BTL